# Patient Record
Sex: FEMALE | Race: OTHER
[De-identification: names, ages, dates, MRNs, and addresses within clinical notes are randomized per-mention and may not be internally consistent; named-entity substitution may affect disease eponyms.]

---

## 2020-07-09 ENCOUNTER — HOSPITAL ENCOUNTER (OUTPATIENT)
Dept: HOSPITAL 62 - OD | Age: 35
End: 2020-07-09
Attending: PHYSICIAN ASSISTANT
Payer: COMMERCIAL

## 2020-07-09 DIAGNOSIS — M54.41: Primary | ICD-10-CM

## 2020-07-09 PROCEDURE — 72110 X-RAY EXAM L-2 SPINE 4/>VWS: CPT

## 2020-07-09 NOTE — RADIOLOGY REPORT (SQ)
EXAM DESCRIPTION:  LUMBAR SPINE COMPLETE



IMAGES COMPLETED DATE/TIME:  7/9/2020 2:02 pm



REASON FOR STUDY:  LUMBAGO WITH SCIATICA, RIGHT SIDE M54.41  LUMBAGO WITH SCIATICA, RIGHT SIDE



COMPARISON:  None.



NUMBER OF VIEWS:  Five views including obliques.



TECHNIQUE:  AP, lateral, oblique, and sacral radiographic images acquired of the lumbar spine.



LIMITATIONS:  None.



FINDINGS:  MINERALIZATION: Normal.

SEGMENTATION: Normal.  No transitional anatomy.

ALIGNMENT: Normal.

VERTEBRAE: Maintained height.  No fracture or worrisome bone lesion.

DISCS: Preserved height.  No significant osteophytes or end plate irregularity.

POSTERIOR ELEMENTS: Pedicles and facets are intact.  No pars defect or posterior arch defects.

HARDWARE: None in the spine.

PARASPINAL SOFT TISSUES: Normal.

PELVIS: Intact as visualized. No fractures or worrisome bone lesions. SI joints intact.

OTHER: No other significant finding.



IMPRESSION:  NORMAL 5 VIEW LUMBAR SPINE.



TECHNICAL DOCUMENTATION:  JOB ID:  [id number]

 2011 [employer]- All Rights Reserved



Reading location - IP/workstation name: [email or web address]

## 2021-01-14 ENCOUNTER — HOSPITAL ENCOUNTER (EMERGENCY)
Dept: HOSPITAL 62 - ER | Age: 36
LOS: 1 days | Discharge: HOME | End: 2021-01-15
Payer: COMMERCIAL

## 2021-01-14 DIAGNOSIS — F32.9: ICD-10-CM

## 2021-01-14 DIAGNOSIS — Z79.3: ICD-10-CM

## 2021-01-14 DIAGNOSIS — F41.9: ICD-10-CM

## 2021-01-14 DIAGNOSIS — R10.816: ICD-10-CM

## 2021-01-14 DIAGNOSIS — Z79.899: ICD-10-CM

## 2021-01-14 DIAGNOSIS — R10.11: ICD-10-CM

## 2021-01-14 DIAGNOSIS — K80.20: Primary | ICD-10-CM

## 2021-01-14 DIAGNOSIS — R10.811: ICD-10-CM

## 2021-01-14 DIAGNOSIS — K21.9: ICD-10-CM

## 2021-01-14 PROCEDURE — 81001 URINALYSIS AUTO W/SCOPE: CPT

## 2021-01-14 PROCEDURE — 96375 TX/PRO/DX INJ NEW DRUG ADDON: CPT

## 2021-01-14 PROCEDURE — 96361 HYDRATE IV INFUSION ADD-ON: CPT

## 2021-01-14 PROCEDURE — 80053 COMPREHEN METABOLIC PANEL: CPT

## 2021-01-14 PROCEDURE — 83690 ASSAY OF LIPASE: CPT

## 2021-01-14 PROCEDURE — 96374 THER/PROPH/DIAG INJ IV PUSH: CPT

## 2021-01-14 PROCEDURE — 81025 URINE PREGNANCY TEST: CPT

## 2021-01-14 PROCEDURE — 76705 ECHO EXAM OF ABDOMEN: CPT

## 2021-01-14 PROCEDURE — 85025 COMPLETE CBC W/AUTO DIFF WBC: CPT

## 2021-01-14 PROCEDURE — 99285 EMERGENCY DEPT VISIT HI MDM: CPT

## 2021-01-14 PROCEDURE — 36415 COLL VENOUS BLD VENIPUNCTURE: CPT

## 2021-01-15 VITALS — DIASTOLIC BLOOD PRESSURE: 64 MMHG | SYSTOLIC BLOOD PRESSURE: 119 MMHG

## 2021-01-15 LAB
ADD MANUAL DIFF: NO
ALBUMIN SERPL-MCNC: 4.5 G/DL (ref 3.5–5)
ALP SERPL-CCNC: 64 U/L (ref 38–126)
ANION GAP SERPL CALC-SCNC: 10 MMOL/L (ref 5–19)
APPEARANCE UR: CLEAR
APTT PPP: YELLOW S
AST SERPL-CCNC: 25 U/L (ref 14–36)
BASOPHILS # BLD AUTO: 0 10^3/UL (ref 0–0.2)
BASOPHILS NFR BLD AUTO: 0.4 % (ref 0–2)
BILIRUB DIRECT SERPL-MCNC: 0 MG/DL (ref 0–0.4)
BILIRUB SERPL-MCNC: 1.2 MG/DL (ref 0.2–1.3)
BILIRUB UR QL STRIP: NEGATIVE
BUN SERPL-MCNC: 9 MG/DL (ref 7–20)
CALCIUM: 9.6 MG/DL (ref 8.4–10.2)
CHLORIDE SERPL-SCNC: 102 MMOL/L (ref 98–107)
CO2 SERPL-SCNC: 28 MMOL/L (ref 22–30)
EOSINOPHIL # BLD AUTO: 0.1 10^3/UL (ref 0–0.6)
EOSINOPHIL NFR BLD AUTO: 0.9 % (ref 0–6)
ERYTHROCYTE [DISTWIDTH] IN BLOOD BY AUTOMATED COUNT: 13 % (ref 11.5–14)
GLUCOSE SERPL-MCNC: 110 MG/DL (ref 75–110)
GLUCOSE UR STRIP-MCNC: NEGATIVE MG/DL
HCT VFR BLD CALC: 37.6 % (ref 36–47)
HGB BLD-MCNC: 12.7 G/DL (ref 12–15.5)
KETONES UR STRIP-MCNC: NEGATIVE MG/DL
LYMPHOCYTES # BLD AUTO: 2.9 10^3/UL (ref 0.5–4.7)
LYMPHOCYTES NFR BLD AUTO: 32.1 % (ref 13–45)
MCH RBC QN AUTO: 28.7 PG (ref 27–33.4)
MCHC RBC AUTO-ENTMCNC: 33.9 G/DL (ref 32–36)
MCV RBC AUTO: 85 FL (ref 80–97)
MONOCYTES # BLD AUTO: 0.5 10^3/UL (ref 0.1–1.4)
MONOCYTES NFR BLD AUTO: 5.6 % (ref 3–13)
NEUTROPHILS # BLD AUTO: 5.6 10^3/UL (ref 1.7–8.2)
NEUTS SEG NFR BLD AUTO: 61 % (ref 42–78)
NITRITE UR QL STRIP: NEGATIVE
PH UR STRIP: 6 [PH] (ref 5–9)
PLATELET # BLD: 355 10^3/UL (ref 150–450)
POTASSIUM SERPL-SCNC: 4 MMOL/L (ref 3.6–5)
PROT SERPL-MCNC: 7.8 G/DL (ref 6.3–8.2)
PROT UR STRIP-MCNC: NEGATIVE MG/DL
RBC # BLD AUTO: 4.43 10^6/UL (ref 3.72–5.28)
SP GR UR STRIP: 1.01
TOTAL CELLS COUNTED % (AUTO): 100 %
UROBILINOGEN UR-MCNC: NEGATIVE MG/DL (ref ?–2)
WBC # BLD AUTO: 9.1 10^3/UL (ref 4–10.5)

## 2021-01-15 NOTE — ER DOCUMENT REPORT
Entered by PHILIPPE LUGO SCRIBE  01/15/21 0715 





Acting as scribe for:FINA JACK MD





ED General





- General


Chief Complaint: Abdominal Pain


Stated Complaint: SEVERE ABDOMINAL PAIN


Time Seen by Provider: 01/15/21 06:53


[facility] Care Provider: 


[facility] - Follow up in 1 week


REBECCA VELASQUEZ PA [Primary Care Provider] - Follow up as needed


Information source: Patient


Notes: 





This 35-year-old female patient presents to the emergency department today with 

complaints of right upper quadrant abdominal pain for the last 4 to 5 days.  She

reports that she was seen at an urgent care when her pain first began and they 

gave her medication for reflux which did not change her pain.  Patient reports 

that the pain now radiates to her right back.








- Related Data


Allergies/Adverse Reactions: 


                                        





No Known Allergies Allergy (Verified 04/26/14 13:27)


   








Home Medications: crisell -birth control.  escitalopram 10 mg.  pantoprazole 40 

mg fro UC yesterday





Past Medical History





- General


Information source: Patient





- Social History


Smoking Status: Never Smoker


Cigarette use (# per day): No


Chew tobacco use (# tins/day): No


Frequency of alcohol use: None


Drug Abuse: None


Lives with: Family


Family History: Reviewed & Not Pertinent


GI Medical History: Reports: Hx Gastroesophageal Reflux Disease


Psychiatric Medical History: Reports: Hx Anxiety, Hx Depression


Past Surgical History: Reports: Hx Tonsillectomy





- Immunizations


Hx Diphtheria, Pertussis, Tetanus Vaccination: Yes





Review of Systems





- Review of Systems


Constitutional: No symptoms reported


EENT: No symptoms reported


Cardiovascular: No symptoms reported


Respiratory: No symptoms reported


Gastrointestinal: See HPI, Abdominal pain


Genitourinary: No symptoms reported


Female Genitourinary: No symptoms reported


Musculoskeletal: No symptoms reported


Skin: No symptoms reported


Hematologic/Lymphatic: No symptoms reported


Neurological/Psychological: No symptoms reported


-: Yes All other systems reviewed and negative





Physical Exam





- Vital signs


Vitals: 


                                        











Temp Pulse Resp BP Pulse Ox


 


 98.1 F   68   16   147/77 H  100 


 


 01/14/21 21:36  01/14/21 21:36  01/14/21 21:36  01/14/21 21:36  01/14/21 21:36














- Notes


Notes: 





Physical Exam:


 


General: Alert, appears well. 


 


HEENT: Normocephalic. Atraumatic. PERRL. Extraocular movements intact. 

Oropharynx clear.


 


Neck: Supple. Non-tender.


 


Respiratory: No respiratory distress. Clear and equal breath sounds bilaterally.


 


Cardiovascular: Regular rate and rhythm. 


 


Abdominal: Epigastric and right upper quadrant tenderness to palpation, 

complains of radiation of pain to her right back. No distension. Normal Bowel 

Sounds. 


 


Back: No gross abnormalities. 


 


Extremities: Moves all four extremities.


Upper extremities: Normal inspection. Normal ROM.  


Lower extremities: Normal inspection. No edema. Normal ROM.


 


Neurological: Normal cognition. AAOx4. Normal speech.  


 


Psychological: Normal affect. Normal Mood. 


 


Skin: Warm. Dry. Normal color.





Course





- Vital Signs


Vital signs: 


                                        











Temp Pulse Resp BP Pulse Ox


 


 98.2 F   59 L  15   119/64   100 


 


 01/15/21 02:55  01/15/21 10:53  01/15/21 10:53  01/15/21 10:53  01/15/21 10:53














- Laboratory Results


Result Diagrams: 


                                 01/15/21 01:24





                                 01/15/21 01:24


Laboratory Results Interpreted: 


                                        











  01/15/21





  01:24


 


Urine Blood  SMALL H











Critical Laboratory Results Reviewed: No Critical Results





- Radiology Results


Radiology Results Interpreted: 





01/15/21 09:02


Gallbladder ultrasound shows innumerable stones and sludge throughout the 

gallbladder lumen.  No definitive secondary evidence of acute cholecystitis.


Critical Radiology Results Reviewed: Yes


Attending or Supervising Physician who Reviewed Radiology: FINA JACK





- Consults


  ** Dr. Cazares


Time consulted: 09:00


Consulted provider: follow-up in office





Discharge





- Discharge


Clinical Impression: 


 Cholelithiasis without cholecystitis





Condition: Stable


Disposition: HOME, SELF-CARE


Additional Instructions: 


Gallbladder Disease





     Your evaluation shows evidence of gallbladder disease.  The gallbladder is 

a pouch under the liver which stores bile.  Stones, infection, or irritation of 

the gallbladder cause attacks of pain. Certain foods -- fats in particular -- 

may provoke attacks.


     The usual treatment for gallbladder disease is surgical removal of the gal

lbladder -- called a cholecystectomy.  You will be referred to a physician 

qualified to advise you on the best treatment for your problem.


     Hospitalization is not necessary.  Take clear liquids only until you are 

painfree.  After that, you should stay on a low-fat diet, with frequent SMALL 

meals.


     Call the doctor or return at once if you develop severe pain, repeated 

vomiting, fever, or jaundice (a yellow color in the skin and whites of the 

eyes).





Low-Fat Diet





     The physician has recommended a low-fat diet.  This diet is often used for 

gallbladder or pancreas problems.


     Your meals should be high-carbohydrate (potato, apples, noodles, breads, 

vegetables).  Eat fish or skinless chicken (boiled or baked rather than fried) 

for protein.  Beans and peas are good sources of fat-free protein.  Soups are 

usually very low-fat.


     Don't eat anything fried.  Avoid red meats.  Avoid most dairy products.  

Skim milk and non-fat yogurt are OK.  Most popular cheeses are very high-fat.  

Don't add butter or sauces -- use lemon or pepper instead.


     If you like salads, use one of the new "non-fat" dressings.


     "Fast Food" is "fat food."  There is virtually nothing from a typical fast-

food restaurant that you can eat.  Fish patties and chicken nuggets are almost 

always deep-fat fried.  "Special Sauces" are mostly fat.








*******************************************************

****************************************************************





Call [facility] to schedule an appointment next week.


Stay on a low-fat diet.


Take the pain medication when you have an attack of pain.





RETURN TO THE EMERGENCY ROOM IF ANY NEW OR WORSENING SYMPTOMS.








Prescriptions: 


Hydrocodone/Acetaminophen [Norco 5-325 mg Tablet] 1 tab PO ASDIR PRN #15 tablet


 PRN Reason: For Pain


Forms:  Return to Work


Referrals: 


REBECCA VELASQUEZ PA [Primary Care Provider] - Follow up as needed


[facility] [Provider Group] - Follow up in 1 week





I personally performed the services described in the documentation, reviewed and

edited the documentation which was dictated to the scribe in my presence, and it

accurately records my words and actions.

## 2021-01-15 NOTE — RADIOLOGY REPORT (SQ)
EXAM DESCRIPTION:  U/S ABDOMEN LIMITED W/O DOP



IMAGES COMPLETED DATE/TIME:  1/15/2021 8:03 am



REASON FOR STUDY:  RUQ abd pain



COMPARISON:  None.



TECHNIQUE:  Dynamic and static grayscale images acquired of the abdomen and recorded on PACS. Additio
nal selected color Doppler and spectral images recorded.



LIMITATIONS:  None.



FINDINGS:  PANCREAS: No masses.  Visualized pancreatic duct normal caliber.

LIVER: No masses. Echotexture normal.

LIVER VASCULATURE: Normal directional flow of the main portal vein and hepatic veins.

GALLBLADDER: Innumerable stones and sludge throughout the gallbladder lumen.  No definitive wall thic
kening (2 mm).  No pericholecystic fluid.

ULTRASOUND-DETECTED QUINN'S SIGN: Indeterminate.  Patient recently taking pain medication.

INTRAHEPATIC DUCTS AND COMMON DUCT: CBD and intrahepatic ducts normal caliber. No filling defects.

INFERIOR VENA CAVA: Normal flow.

AORTA: No aneurysm.

RIGHT KIDNEY:  Normal size measuring 11.6 cm. Normal echogenicity. No solid or suspicious masses. No 
hydronephrosis. No calcifications.

PERITONEAL AND RIGHT PLEURAL SPACE: No ascites or effusions.

OTHER: No other significant findings.



IMPRESSION:  Innumerable stones and sludge throughout the gallbladder lumen.  No definitive secondary
 evidence of acute cholecystitis.



TECHNICAL DOCUMENTATION:  JOB ID:  [id number]

 2011 [employer]- All Rights Reserved



Reading location - IP/workstation name: [email or web address]